# Patient Record
Sex: MALE | Race: BLACK OR AFRICAN AMERICAN | Employment: FULL TIME | URBAN - NONMETROPOLITAN AREA
[De-identification: names, ages, dates, MRNs, and addresses within clinical notes are randomized per-mention and may not be internally consistent; named-entity substitution may affect disease eponyms.]

---

## 2018-04-01 ENCOUNTER — NURSE TRIAGE (OUTPATIENT)
Dept: ADMINISTRATIVE | Age: 33
End: 2018-04-01

## 2018-08-17 ENCOUNTER — HOSPITAL ENCOUNTER (EMERGENCY)
Age: 33
Discharge: HOME | End: 2018-08-17
Payer: MEDICAID

## 2018-08-17 VITALS
BODY MASS INDEX: 32.93 KG/M2 | RESPIRATION RATE: 16 BRPM | HEIGHT: 70 IN | SYSTOLIC BLOOD PRESSURE: 152 MMHG | TEMPERATURE: 99 F | HEART RATE: 85 BPM | DIASTOLIC BLOOD PRESSURE: 105 MMHG | OXYGEN SATURATION: 96 % | WEIGHT: 230 LBS

## 2018-08-17 DIAGNOSIS — T63.481A INSECT STINGS, ACCIDENTAL OR UNINTENTIONAL, INITIAL ENCOUNTER: Primary | ICD-10-CM

## 2018-08-17 DIAGNOSIS — R03.0 ELEVATED BLOOD PRESSURE READING IN OFFICE WITHOUT DIAGNOSIS OF HYPERTENSION: ICD-10-CM

## 2018-08-17 PROCEDURE — 99213 OFFICE O/P EST LOW 20 MIN: CPT

## 2018-08-17 PROCEDURE — 99202 OFFICE O/P NEW SF 15 MIN: CPT | Performed by: NURSE PRACTITIONER

## 2018-08-17 RX ORDER — CETIRIZINE HYDROCHLORIDE 10 MG/1
10 TABLET ORAL DAILY
Qty: 30 TABLET | Refills: 0 | Status: SHIPPED | OUTPATIENT
Start: 2018-08-17 | End: 2018-09-16

## 2018-08-17 ASSESSMENT — PAIN DESCRIPTION - ORIENTATION: ORIENTATION: LEFT

## 2018-08-17 ASSESSMENT — PAIN SCALES - GENERAL: PAINLEVEL_OUTOF10: 5

## 2018-08-17 ASSESSMENT — ENCOUNTER SYMPTOMS
SHORTNESS OF BREATH: 0
NAUSEA: 0
CHEST TIGHTNESS: 0
ABDOMINAL PAIN: 0
VOMITING: 0
DIARRHEA: 0

## 2018-08-17 ASSESSMENT — PAIN DESCRIPTION - LOCATION: LOCATION: ARM

## 2018-08-17 NOTE — ED PROVIDER NOTES
and well-nourished. He is cooperative. He does not appear ill. No distress. HENT:   Head: Normocephalic. Mouth/Throat: Mucous membranes are normal.   Eyes: Conjunctivae are normal.   Neck: Normal range of motion. Cardiovascular: Normal rate. Pulmonary/Chest: Effort normal. No accessory muscle usage. No respiratory distress. Neurological: He is alert and oriented to person, place, and time. Skin: Skin is warm and dry. No rash noted. There is erythema (LUE wrist to elbow with minimal swelling. ). Psychiatric: He has a normal mood and affect. His speech is normal.   Nursing note and vitals reviewed. DIAGNOSTIC RESULTS   Labs:No results found for this visit on 08/17/18. IMAGING:  No orders to display     URGENT CARE COURSE:     Vitals:    08/17/18 1738 08/17/18 1743   BP: (!) 149/103 (!) 152/105   Pulse: 85    Resp: 16    Temp: 99 °F (37.2 °C)    TempSrc: Temporal    SpO2: 96%    Weight: 230 lb (104.3 kg)    Height: 5' 10\" (1.778 m)        Medications - No data to display  PROCEDURES:  None  FINAL IMPRESSION      1. Insect stings, accidental or unintentional, initial encounter    2. Elevated blood pressure reading in office without diagnosis of hypertension        DISPOSITION/PLAN   DISPOSITION    Discharge   Physical assessment findings, diagnostic testing(s) if applicable, and vital signs reviewed with patient/patient representative. Questions answered. Medications as directed, including OTC medications for supportive care. Education provided on medications. Differential diagnosis(s) discussed with patient/patient representative. Home care/self care instructions reviewed with patient/patient representative. Patient is to follow-up with family care provider in 2-3 days if no improvement. Patient is to go to the emergency department if symptoms worsen. Patient/patient representative is aware of care plan, questions answered, verbalizes understanding and is in agreement.   Teach back method used for patient/patient representative teaching(s) and printed instructions attached to after visit summary. PATIENT REFERRED TO:  [address] [place] [address] Urgent Care  [address] [facility]  [phone]    As needed, If symptoms worsen, GO DIRECTLY TO [address] [place] [address].  [address]  [place] [address]  [phone]    Schedule an appointment as soon as possible for a visit   For suture removal    DISCHARGE MEDICATIONS:  Discharge Medication List as of 8/17/2018  5:52 PM      START taking these medications    Details   cetirizine (ZYRTEC ALLERGY) 10 MG tablet Take 1 tablet by mouth daily, Disp-30 tablet, R-0Print           Discharge Medication List as of 8/17/2018  5:52 PM          Gerda Donovan, [address], APRN - CNP  08/17/18 4256

## 2019-01-27 ENCOUNTER — HOSPITAL ENCOUNTER (EMERGENCY)
Age: 34
Discharge: HOME | End: 2019-01-27

## 2019-01-27 VITALS
SYSTOLIC BLOOD PRESSURE: 144 MMHG | RESPIRATION RATE: 16 BRPM | HEART RATE: 76 BPM | OXYGEN SATURATION: 98 % | TEMPERATURE: 98.2 F | DIASTOLIC BLOOD PRESSURE: 76 MMHG

## 2019-01-27 DIAGNOSIS — J02.9 ACUTE PHARYNGITIS, UNSPECIFIED ETIOLOGY: Primary | ICD-10-CM

## 2019-01-27 LAB
GROUP A STREP CULTURE, REFLEX: NEGATIVE
REFLEX THROAT C + S: NORMAL

## 2019-01-27 PROCEDURE — 87880 STREP A ASSAY W/OPTIC: CPT

## 2019-01-27 PROCEDURE — 99213 OFFICE O/P EST LOW 20 MIN: CPT | Performed by: NURSE PRACTITIONER

## 2019-01-27 PROCEDURE — 87070 CULTURE OTHR SPECIMN AEROBIC: CPT

## 2019-01-27 PROCEDURE — 87077 CULTURE AEROBIC IDENTIFY: CPT

## 2019-01-27 PROCEDURE — 99213 OFFICE O/P EST LOW 20 MIN: CPT

## 2019-01-27 RX ORDER — AMOXICILLIN 500 MG/1
500 CAPSULE ORAL 2 TIMES DAILY
Qty: 20 CAPSULE | Refills: 0 | Status: SHIPPED | OUTPATIENT
Start: 2019-01-27 | End: 2019-02-06

## 2019-01-27 ASSESSMENT — ENCOUNTER SYMPTOMS
RHINORRHEA: 0
CHEST TIGHTNESS: 0
VOMITING: 0
SINUS PRESSURE: 0
SHORTNESS OF BREATH: 0
COUGH: 0
ABDOMINAL PAIN: 0
SORE THROAT: 1
TROUBLE SWALLOWING: 0
DIARRHEA: 0
NAUSEA: 0

## 2019-01-30 LAB
ORGANISM: ABNORMAL
THROAT/NOSE CULTURE: ABNORMAL
THROAT/NOSE CULTURE: ABNORMAL

## 2019-03-30 ENCOUNTER — HOSPITAL ENCOUNTER (OUTPATIENT)
Age: 34
Setting detail: SPECIMEN
Discharge: HOME | End: 2019-03-30

## 2019-04-01 LAB
C. TRACHOMATIS DNA ,URINE: NEGATIVE
N. GONORRHOEAE DNA, URINE: NEGATIVE
SPECIMEN DESCRIPTION: NORMAL

## 2019-04-05 LAB
SEND OUT REPORT: NORMAL
TEST NAME: NORMAL

## 2019-07-10 ENCOUNTER — HOSPITAL ENCOUNTER (EMERGENCY)
Age: 34
Discharge: HOME | End: 2019-07-10
Payer: MEDICAID

## 2019-07-10 VITALS
BODY MASS INDEX: 35.07 KG/M2 | WEIGHT: 245 LBS | OXYGEN SATURATION: 98 % | RESPIRATION RATE: 16 BRPM | DIASTOLIC BLOOD PRESSURE: 92 MMHG | TEMPERATURE: 98.2 F | HEART RATE: 87 BPM | SYSTOLIC BLOOD PRESSURE: 136 MMHG | HEIGHT: 70 IN

## 2019-07-10 DIAGNOSIS — R03.0 ELEVATED BLOOD PRESSURE READING: Primary | ICD-10-CM

## 2019-07-10 PROCEDURE — 99213 OFFICE O/P EST LOW 20 MIN: CPT | Performed by: NURSE PRACTITIONER

## 2019-07-10 PROCEDURE — 99212 OFFICE O/P EST SF 10 MIN: CPT

## 2019-07-10 NOTE — ED TRIAGE NOTES
Checked B/p at [employer] and got nervous that it may be too high. States he has had episodes of  Blurred vision and dizziness last two weeks.

## 2019-07-10 NOTE — ED PROVIDER NOTES
[facility]  Urgent Care Encounter      CHIEFCOMPLAINT       Chief Complaint   Patient presents with    Hypertension     onset 2 weeks, and occasional bloody noses. took B/p at [employer] and it was 148 105. Nurses Notes reviewed and I agree except as noted in the HPI. HISTORY OF PRESENT ILLNESS   Josselyn Rebolledo is a 26 y.o. male who presents: The history is provided by the patient. Hypertension   Onset quality:  Sudden  Timing:  Intermittent  Progression:  Unchanged  Chronicity:  New  Notable PTA blood pressures:  148/105 at [employer] machine  Context: not medication change, not OTC medications used and not stress    Relieved by:  None tried  Worsened by:  Nothing  Ineffective treatments:  None tried  Associated symptoms: dizziness (has resolved. Had with high blood pressure reading) and epistaxis (resolved)    Associated symptoms: no abdominal pain, no anxiety, no blurred vision, no chest pain, no confusion, no ear pain, no fatigue, no fever, no headaches, no hypokalemia, no loss of consciousness, no nausea, no neck pain, no palpitations, no peripheral edema, no shortness of breath, no syncope, no tinnitus, not vomiting and no weakness    Associated symptoms comment:  Past two weeks he has had dizziness and an occasional bloody nose he went and checked his blood pressure and noted it to be high. No longer has dizziness or epistaxis. Dizziness:     Severity:  Mild    Duration:  2 weeks    Timing:  Intermittent    Progression:  Resolved  Risk factors: family hx of HTN, obesity and tobacco use    Risk factors: no cardiac disease, no decongestant use, no diabetes, no kidney disease, no prior aneurysm and no prior stroke        REVIEW OF SYSTEMS     Review of Systems   Constitutional: Negative for activity change, appetite change, chills, diaphoresis, fatigue and fever. HENT: Positive for nosebleeds (resolved). Negative for ear pain and tinnitus.     Eyes: Negative for pre-hypertension or hypertension based on a blood pressure reading in the urgent care department. The patient was advised to monitor their pressure daily at possible. Joanna Metcalf also recommended to stop smoking day her tobacco dependency, decrease caffeine, lose weight, increase in exercise, take the medicine on a daily basis and they're taking blood pressure medication. I recommend that the patient call the primary care provider listed on their discharge instructions or a physician of their choice this week to arrange follow up for further evaluation of possible pre-hypertension or hypertension. If they develop any chest pain, shortness of breath, or any diaphoresis they're to dial 911 or go to the emergency department for further evaluation. They are agreeable to the treatment plan and left ambulatory without any problems. PATIENT REFERRED TO:  [address] [place] [address],[address] [facility]. [place] [address]  [phone]  Call in 1 day  , For appointment and to establish PCP take log    Patient instructed to follow up with PCP. If symptoms worsen, become severe or new symptoms develop patient instructedto go to the emergency room immediately. DISCHARGE MEDICATIONS:  There are no discharge medications for this patient. There are no discharge medications for this patient. Patient giveneducational materials - see patient instructions. Discussed use, benefit, and side effects of prescribed medications. All patient questions answered. Pt voiced understanding. Reviewed health maintenance. Patient agreedwith treatment plan. Follow up as directed.      DEB Barrow CNP, APRN - CNP  07/24/19 8516

## 2019-07-24 ASSESSMENT — ENCOUNTER SYMPTOMS
SHORTNESS OF BREATH: 0
COUGH: 0
BLURRED VISION: 0
STRIDOR: 0
NAUSEA: 0
ABDOMINAL PAIN: 0
BACK PAIN: 0
WHEEZING: 0
VOMITING: 0
EYE PAIN: 0

## 2021-04-11 ENCOUNTER — HOSPITAL ENCOUNTER (EMERGENCY)
Age: 36
Discharge: HOME | End: 2021-04-11
Attending: EMERGENCY MEDICINE
Payer: COMMERCIAL

## 2021-04-11 ENCOUNTER — APPOINTMENT (OUTPATIENT)
Dept: GENERAL RADIOLOGY | Age: 36
End: 2021-04-11
Payer: COMMERCIAL

## 2021-04-11 VITALS
SYSTOLIC BLOOD PRESSURE: 167 MMHG | RESPIRATION RATE: 18 BRPM | WEIGHT: 220 LBS | TEMPERATURE: 97.5 F | DIASTOLIC BLOOD PRESSURE: 120 MMHG | HEIGHT: 70 IN | BODY MASS INDEX: 31.5 KG/M2 | HEART RATE: 102 BPM | OXYGEN SATURATION: 94 %

## 2021-04-11 DIAGNOSIS — F19.10 SUBSTANCE ABUSE (HCC): Primary | ICD-10-CM

## 2021-04-11 PROCEDURE — 71045 X-RAY EXAM CHEST 1 VIEW: CPT

## 2021-04-11 PROCEDURE — 99283 EMERGENCY DEPT VISIT LOW MDM: CPT

## 2021-04-11 ASSESSMENT — ENCOUNTER SYMPTOMS
EYE REDNESS: 0
ABDOMINAL PAIN: 0
TROUBLE SWALLOWING: 0
BACK PAIN: 0
SHORTNESS OF BREATH: 0
VOMITING: 0
NAUSEA: 0
CHEST TIGHTNESS: 1
COUGH: 0

## 2021-04-11 NOTE — ED PROVIDER NOTES
[facility] [address] [address] EMERGENCY DEPT    EMERGENCY MEDICINE     Pt Name: Aaron Alejandra  MRN: [id number]  [place] 1985  Date of evaluation: 4/11/2021  Provider: Jose Manuel Michaud MD,     CHIEF COMPLAINT       Chief Complaint   Patient presents with    Paranoid    Ingestion     ecstasy        HISTORY OF PRESENT ILLNESS    Aaron Alejandra is a pleasant 38 y.o. male who presents to the emergency department from home for evaluation of paranoia. Per EMS the patient has called 911 so many times this evening that his phone ran out of battery. Patient states that he was supposed to be getting ecstasy from his friends and he feels like they gave him something different. Patient states that he was told that he was being paranoid but he did not feel that he was being paranoid. He denies any homicidal or suicidal thoughts. Patient does state that he has some mild rib tenderness when he takes a deep breath that has been ongoing for months. He also complains of clicking in his right knee that has been ongoing for 1 year. Patient states that he only came to the emergency room because the police recommended that he get checked out prior to going home. Triage notes and Nursing notes were reviewed by myself. Any discrepancies are addressed above. PAST MEDICAL HISTORY   History reviewed. No pertinent past medical history.     SURGICAL HISTORY       Past Surgical History:   Procedure Laterality Date    ANKLE SURGERY      HAND SURGERY Right        CURRENT MEDICATIONS       Previous Medications    No medications on file       ALLERGIES     Dilaudid [hydromorphone]    FAMILY HISTORY       Family History   Problem Relation Age of Onset    High Blood Pressure Mother     High Blood Pressure Father         SOCIAL HISTORY       Social History     Socioeconomic History    Marital status:      Spouse name: None    Number of children: None    Years of education: None    Highest education level: None   Occupational History    None   Social Needs    Financial resource strain: None    Food insecurity     Worry: None     Inability: None    Transportation needs     Medical: None     Non-medical: None   Tobacco Use    Smoking status: Current Every Day Smoker     Packs/day: 1.00     Types: Cigarettes, Cigars    Smokeless tobacco: Never Used   Substance and Sexual Activity    Alcohol use: Yes     Alcohol/week: 1.0 standard drinks     Types: 1 Shots of liquor per week     Comment: 3 x a week    Drug use: Not Currently     Types: Marijuana    Sexual activity: None   Lifestyle    Physical activity     Days per week: None     Minutes per session: None    Stress: None   Relationships    Social connections     Talks on phone: None     Gets together: None     Attends Judaism service: None     Active member of club or organization: None     Attends meetings of clubs or organizations: None     Relationship status: None    Intimate partner violence     Fear of current or ex partner: None     Emotionally abused: None     Physically abused: None     Forced sexual activity: None   Other Topics Concern    None   Social History Narrative    None       REVIEW OF SYSTEMS     Review of Systems   Constitutional: Negative for fatigue and fever. HENT: Negative for congestion and trouble swallowing. Eyes: Negative for redness. Respiratory: Positive for chest tightness. Negative for cough and shortness of breath. Cardiovascular: Negative for chest pain. Gastrointestinal: Negative for abdominal pain, nausea and vomiting. Genitourinary: Negative for difficulty urinating. Musculoskeletal: Positive for arthralgias. Negative for back pain. Skin: Negative for rash. Allergic/Immunologic: Negative for immunocompromised state. Neurological: Negative for light-headedness and headaches. Hematological: Does not bruise/bleed easily. Psychiatric/Behavioral: The patient is nervous/anxious.         Except as noted above the patient agrees        ED Medications administered this visit:  Medications - No data to display      Procedures: (None if blank)       CLINICAL       1.  Substance abuse [facility])          DISPOSITION/PLAN   DISPOSITION Decision To Discharge 04/11/2021 06:27:27 AM      PATIENT REFERRED TO:  Your pcp    Schedule an appointment as soon as possible for a visit   If symptoms worsen      DISCHARGE MEDICATIONS:  New Prescriptions    No medications on file              (Please note that portions of this note were completed with a voice recognition program.  Efforts were made to edit the dictations but occasionallywords are mis-transcribed.)      Electronically signed by Eron Carter MD on 4/11/21 at 6:28 AM EDT    Attending Physician, Emergency Department       Jesus Rosas MD  04/11/21 3173

## 2021-04-11 NOTE — ED NOTES
Pt to ED via [facility] with c/o paranoia per police. Pt himself states that he is not in fact paranoid, but that he hasn't slept in 3 days. Pt states he did cocaine Friday and Saturday and took ecstasy last night. Pt states that he told police to take him home because he feels okay and is having no pain, but they recommended he get checked. Pt states he is curious what will show up on a drug screen, but not in anything else. BP elevated at this time, other VSS. Call light in place.  Pt provided with urinal for a specimen     [facility]  04/11/21 9193

## 2021-11-02 ENCOUNTER — APPOINTMENT (OUTPATIENT)
Dept: GENERAL RADIOLOGY | Age: 36
End: 2021-11-02
Payer: COMMERCIAL

## 2021-11-02 ENCOUNTER — HOSPITAL ENCOUNTER (EMERGENCY)
Age: 36
Discharge: HOME | End: 2021-11-02
Payer: COMMERCIAL

## 2021-11-02 VITALS
SYSTOLIC BLOOD PRESSURE: 154 MMHG | DIASTOLIC BLOOD PRESSURE: 77 MMHG | OXYGEN SATURATION: 98 % | HEART RATE: 78 BPM | TEMPERATURE: 98.3 F | RESPIRATION RATE: 16 BRPM

## 2021-11-02 DIAGNOSIS — S93.402A MILD SPRAIN OF LEFT ANKLE, INITIAL ENCOUNTER: Primary | ICD-10-CM

## 2021-11-02 PROCEDURE — 99213 OFFICE O/P EST LOW 20 MIN: CPT

## 2021-11-02 PROCEDURE — 73610 X-RAY EXAM OF ANKLE: CPT

## 2021-11-02 PROCEDURE — 99214 OFFICE O/P EST MOD 30 MIN: CPT | Performed by: NURSE PRACTITIONER

## 2021-11-02 RX ORDER — IBUPROFEN 200 MG
600 TABLET ORAL EVERY 6 HOURS PRN
Qty: 120 TABLET | Refills: 3 | COMMUNITY
Start: 2021-11-02

## 2021-11-02 ASSESSMENT — PAIN SCALES - GENERAL: PAINLEVEL_OUTOF10: 7

## 2021-11-02 ASSESSMENT — PAIN DESCRIPTION - PAIN TYPE: TYPE: ACUTE PAIN

## 2021-11-02 ASSESSMENT — PAIN DESCRIPTION - ORIENTATION: ORIENTATION: LEFT

## 2021-11-02 ASSESSMENT — PAIN DESCRIPTION - LOCATION: LOCATION: ANKLE

## 2021-11-02 NOTE — Clinical Note
Raul Cuellar was seen and treated in our emergency department on 11/2/2021. He may return to work on 11/03/2021. If you have any questions or concerns, please don't hesitate to call.       Sherial Mortimer, APRN - CNP

## 2021-11-02 NOTE — ED PROVIDER NOTES
[place] [address]  Urgent Care Encounter       CHIEF COMPLAINT       Chief Complaint   Patient presents with    Ankle Injury       Nurses Notes reviewed and I agree except as noted in the HPI. HISTORY OF PRESENT ILLNESS   Ximena Yoon is a 43 y.o. male who presents with complaints of outside left ankle pain after he tripped on some shoes this morning. This is a new problem. He has not tried anything for treatment and presented to urgent care for evaluation. Movement seems to make the pain worse. He denies any swelling or wound. He denies any numbness or tingling. The history is provided by the patient. REVIEW OF SYSTEMS     Review of Systems   Constitutional: Negative for activity change. Musculoskeletal: Positive for arthralgias. Negative for gait problem and joint swelling. Neurological: Negative for weakness and numbness. PAST MEDICAL HISTORY   History reviewed. No pertinent past medical history. SURGICALHISTORY     Patient  has a past surgical history that includes Ankle surgery and Hand surgery (Right). CURRENT MEDICATIONS       Previous Medications    No medications on file       ALLERGIES     Patient is is allergic to dilaudid [hydromorphone]. Patients   There is no immunization history on file for this patient. FAMILY HISTORY     Patient's family history includes High Blood Pressure in his father and mother. SOCIAL HISTORY     Patient  reports that he has been smoking cigarettes and cigars. He has been smoking about 1.00 pack per day. He has never used smokeless tobacco. He reports current alcohol use of about 1.0 standard drinks of alcohol per week. He reports previous drug use. Drug: Marijuana Kayceartur Garrido). PHYSICAL EXAM     ED TRIAGE VITALS  BP: (!) 154/77, Temp: 98.3 °F (36.8 °C), Pulse: 78, Resp: 16, SpO2: 98 %,Estimated body mass index is 31.57 kg/m² as calculated from the following:    Height as of 4/11/21: 5' 10\" (1.778 m).     Weight as of 4/11/21: 220 lb (99.8 kg). ,No LMP for male patient. Physical Exam  Vitals and nursing note reviewed. Constitutional:       General: He is not in acute distress. Cardiovascular:      Rate and Rhythm: Normal rate and regular rhythm. Pulses: Normal pulses. Pulmonary:      Effort: Pulmonary effort is normal.   Musculoskeletal:         General: Tenderness present. No swelling or deformity. Left ankle: No swelling or deformity. Tenderness present over the lateral malleolus. Decreased range of motion. Skin:     General: Skin is warm and dry. Findings: No bruising or erythema. Neurological:      Mental Status: He is alert. DIAGNOSTIC RESULTS     Labs:No results found for this visit on 11/02/21. IMAGING:    XR ANKLE LEFT (MIN 3 VIEWS)   Final Result         1. Stable left ankle radiographs, no interval change since previous study dated 14 February 2014. No acute fracture noted. .               **This report has been created using voice recognition software. It may contain minor errors which are inherent in voice recognition technology. **      Final report electronically signed by DR Mona Morelos on 11/2/2021 11:26 AM          I have independently reviewed the radiology images as well as the radiologist's report and have discussed them with the patient. EKG:  None    URGENT CARE COURSE:     Vitals:    11/02/21 1100   BP: (!) 154/77   Pulse: 78   Resp: 16   Temp: 98.3 °F (36.8 °C)   TempSrc: Temporal   SpO2: 98%       Medications - No data to display         PROCEDURES:  None    FINAL IMPRESSION      1. Mild sprain of left ankle, initial encounter      DISPOSITION/ PLAN   DISPOSITION Decision To Discharge 11/02/2021 11:46:13 AM     Exam consistent with mild sprain of the left ankle. Recommend ibuprofen 4 times daily for discomfort and inflammation. Instructed he should rest, ice, compress, and elevate today. I return to work tomorrow.   [facility] if symptoms worsen or fail to improve in 10 days. PATIENT REFERRED TO:  No primary care provider on file. No primary physician on file.       DISCHARGE MEDICATIONS:  New Prescriptions    IBUPROFEN (ADVIL) 200 MG TABLET    Take 3 tablets by mouth every 6 hours as needed for Pain       Discontinued Medications    No medications on file       Current Discharge Medication List          Leander Saint, APRN - CNP    (Please note that portions of this note were completed with a voice recognition program. Efforts were made to edit the dictations but occasionally words are mis-transcribed.)           Leander Saint, APRN - CNP  11/02/21 5035

## 2021-11-08 ENCOUNTER — HOSPITAL ENCOUNTER (EMERGENCY)
Age: 36
Discharge: HOME | End: 2021-11-08
Attending: EMERGENCY MEDICINE
Payer: COMMERCIAL

## 2021-11-08 ENCOUNTER — APPOINTMENT (OUTPATIENT)
Dept: GENERAL RADIOLOGY | Age: 36
End: 2021-11-08
Payer: COMMERCIAL

## 2021-11-08 VITALS
OXYGEN SATURATION: 97 % | SYSTOLIC BLOOD PRESSURE: 168 MMHG | TEMPERATURE: 99.3 F | DIASTOLIC BLOOD PRESSURE: 89 MMHG | RESPIRATION RATE: 18 BRPM | HEART RATE: 85 BPM

## 2021-11-08 DIAGNOSIS — J45.909 ASTHMATIC BRONCHITIS WITHOUT COMPLICATION, UNSPECIFIED ASTHMA SEVERITY, UNSPECIFIED WHETHER PERSISTENT: Primary | ICD-10-CM

## 2021-11-08 DIAGNOSIS — F17.200 SMOKING: ICD-10-CM

## 2021-11-08 LAB
ANION GAP SERPL CALCULATED.3IONS-SCNC: 12 MEQ/L (ref 8–16)
BASOPHILS # BLD: 0.2 %
BASOPHILS ABSOLUTE: 0 THOU/MM3 (ref 0–0.1)
BUN BLDV-MCNC: 10 MG/DL (ref 7–22)
CALCIUM SERPL-MCNC: 9.3 MG/DL (ref 8.5–10.5)
CHLORIDE BLD-SCNC: 105 MEQ/L (ref 98–111)
CO2: 24 MEQ/L (ref 23–33)
CREAT SERPL-MCNC: 1 MG/DL (ref 0.4–1.2)
EOSINOPHIL # BLD: 3.2 %
EOSINOPHILS ABSOLUTE: 0.4 THOU/MM3 (ref 0–0.4)
ERYTHROCYTE [DISTWIDTH] IN BLOOD BY AUTOMATED COUNT: 12.8 % (ref 11.5–14.5)
ERYTHROCYTE [DISTWIDTH] IN BLOOD BY AUTOMATED COUNT: 46.3 FL (ref 35–45)
FLU A ANTIGEN: NEGATIVE
FLU B ANTIGEN: NEGATIVE
GFR SERPL CREATININE-BSD FRML MDRD: > 90 ML/MIN/1.73M2
GLUCOSE BLD-MCNC: 99 MG/DL (ref 70–108)
HCT VFR BLD CALC: 48.8 % (ref 42–52)
HEMOGLOBIN: 15.9 GM/DL (ref 14–18)
IMMATURE GRANS (ABS): 0.04 THOU/MM3 (ref 0–0.07)
IMMATURE GRANULOCYTES: 0.3 %
LYMPHOCYTES # BLD: 22 %
LYMPHOCYTES ABSOLUTE: 3.1 THOU/MM3 (ref 1–4.8)
MCH RBC QN AUTO: 32.2 PG (ref 26–33)
MCHC RBC AUTO-ENTMCNC: 32.6 GM/DL (ref 32.2–35.5)
MCV RBC AUTO: 98.8 FL (ref 80–94)
MONOCYTES # BLD: 9.8 %
MONOCYTES ABSOLUTE: 1.4 THOU/MM3 (ref 0.4–1.3)
NUCLEATED RED BLOOD CELLS: 0 /100 WBC
OSMOLALITY CALCULATION: 280.3 MOSMOL/KG (ref 275–300)
PLATELET # BLD: 247 THOU/MM3 (ref 130–400)
PMV BLD AUTO: 10.8 FL (ref 9.4–12.4)
POTASSIUM SERPL-SCNC: 4.3 MEQ/L (ref 3.5–5.2)
RBC # BLD: 4.94 MILL/MM3 (ref 4.7–6.1)
SARS-COV-2, NAAT: NOT DETECTED
SEG NEUTROPHILS: 64.5 %
SEGMENTED NEUTROPHILS ABSOLUTE COUNT: 9 THOU/MM3 (ref 1.8–7.7)
SODIUM BLD-SCNC: 141 MEQ/L (ref 135–145)
TROPONIN T: < 0.01 NG/ML
WBC # BLD: 13.9 THOU/MM3 (ref 4.8–10.8)

## 2021-11-08 PROCEDURE — 80048 BASIC METABOLIC PNL TOTAL CA: CPT

## 2021-11-08 PROCEDURE — 36415 COLL VENOUS BLD VENIPUNCTURE: CPT

## 2021-11-08 PROCEDURE — 85025 COMPLETE CBC W/AUTO DIFF WBC: CPT

## 2021-11-08 PROCEDURE — 99281 EMR DPT VST MAYX REQ PHY/QHP: CPT

## 2021-11-08 PROCEDURE — 94640 AIRWAY INHALATION TREATMENT: CPT

## 2021-11-08 PROCEDURE — 71046 X-RAY EXAM CHEST 2 VIEWS: CPT

## 2021-11-08 PROCEDURE — 6370000000 HC RX 637 (ALT 250 FOR IP): Performed by: EMERGENCY MEDICINE

## 2021-11-08 PROCEDURE — 93005 ELECTROCARDIOGRAM TRACING: CPT | Performed by: EMERGENCY MEDICINE

## 2021-11-08 PROCEDURE — 87804 INFLUENZA ASSAY W/OPTIC: CPT

## 2021-11-08 PROCEDURE — 84484 ASSAY OF TROPONIN QUANT: CPT

## 2021-11-08 PROCEDURE — 87635 SARS-COV-2 COVID-19 AMP PRB: CPT

## 2021-11-08 RX ORDER — AZITHROMYCIN 250 MG/1
TABLET, FILM COATED ORAL
Qty: 1 PACKET | Refills: 0 | Status: SHIPPED | OUTPATIENT
Start: 2021-11-08 | End: 2021-11-18

## 2021-11-08 RX ORDER — PREDNISONE 10 MG/1
TABLET ORAL
Qty: 15 TABLET | Refills: 0 | Status: SHIPPED | OUTPATIENT
Start: 2021-11-08

## 2021-11-08 RX ORDER — PREDNISONE 10 MG/1
TABLET ORAL
Qty: 16 TABLET | Refills: 0 | Status: SHIPPED | OUTPATIENT
Start: 2021-11-08 | End: 2021-11-08 | Stop reason: SDUPTHER

## 2021-11-08 RX ORDER — ALBUTEROL SULFATE 90 UG/1
2 AEROSOL, METERED RESPIRATORY (INHALATION) 4 TIMES DAILY PRN
Qty: 18 G | Refills: 0 | Status: SHIPPED | OUTPATIENT
Start: 2021-11-08 | End: 2021-11-08 | Stop reason: SDUPTHER

## 2021-11-08 RX ORDER — ALBUTEROL SULFATE 90 UG/1
2 AEROSOL, METERED RESPIRATORY (INHALATION) 4 TIMES DAILY PRN
Qty: 18 G | Refills: 0 | Status: SHIPPED | OUTPATIENT
Start: 2021-11-08

## 2021-11-08 RX ORDER — IPRATROPIUM BROMIDE AND ALBUTEROL SULFATE 2.5; .5 MG/3ML; MG/3ML
1 SOLUTION RESPIRATORY (INHALATION) ONCE
Status: COMPLETED | OUTPATIENT
Start: 2021-11-08 | End: 2021-11-08

## 2021-11-08 RX ORDER — AZITHROMYCIN 250 MG/1
TABLET, FILM COATED ORAL
Qty: 1 PACKET | Refills: 0 | Status: SHIPPED | OUTPATIENT
Start: 2021-11-08 | End: 2021-11-08 | Stop reason: SDUPTHER

## 2021-11-08 RX ADMIN — IPRATROPIUM BROMIDE AND ALBUTEROL SULFATE 1 AMPULE: .5; 3 SOLUTION RESPIRATORY (INHALATION) at 13:50

## 2021-11-08 ASSESSMENT — PAIN DESCRIPTION - PAIN TYPE: TYPE: ACUTE PAIN

## 2021-11-08 ASSESSMENT — PAIN DESCRIPTION - LOCATION: LOCATION: CHEST

## 2021-11-08 ASSESSMENT — PAIN SCALES - GENERAL: PAINLEVEL_OUTOF10: 8

## 2021-11-08 NOTE — ED PROVIDER NOTES
[address] [facility]        Room # 24/791I    CHIEF COMPLAINT    Chief Complaint   Patient presents with    Chest Pain    Cough       Nurses Notes reviewed and I agree except as noted in the HPI. HPI    Kendy Ahumada is a 43 y.o. male who presents for evaluation of coughing and congestion since yesterday. Patient slept all day long, patient last night started coughing more and breathing hard, today had a fever 102.4. Patient is unvaccinated, denies any chest pain no nausea vomiting no diarrhea. Patient is a smoker. Patient denies anosmia or ageusia      REVIEW OF SYSTEMS    Review of Systems   Constitutional: Positive for chills and fever. Negative for appetite change, diaphoresis and fatigue. HENT: Positive for congestion and rhinorrhea. Negative for ear discharge, ear pain, postnasal drip, sinus pressure, sore throat, trouble swallowing and voice change. Respiratory: Positive for cough and shortness of breath. Negative for chest tightness and wheezing. Cardiovascular: Negative for chest pain, palpitations and leg swelling. Gastrointestinal: Negative for abdominal pain, constipation, diarrhea, nausea and vomiting. Musculoskeletal: Negative for arthralgias, back pain, joint swelling, myalgias, neck pain and neck stiffness. Skin: Negative for rash. Neurological: Negative for dizziness, syncope, weakness, light-headedness, numbness and headaches. PAST MEDICAL HISTORY     has no past medical history on file. SURGICAL HISTORY   has a past surgical history that includes Ankle surgery and Hand surgery (Right). CURRENT MEDICATIONS    Previous Medications    IBUPROFEN (ADVIL) 200 MG TABLET    Take 3 tablets by mouth every 6 hours as needed for Pain       ALLERGIES    is allergic to dilaudid [hydromorphone]. FAMILY HISTORY    He indicated that his mother is alive. He indicated that his father is alive.    family history includes High Blood Pressure in his father and mother. SOCIAL HISTORY     reports that he has been smoking cigarettes and cigars. He has been smoking about 1.00 pack per day. He has never used smokeless tobacco. He reports current alcohol use of about 1.0 standard drink of alcohol per week. He reports previous drug use. Drug: Marijuana Shweta Ambbismark). PHYSICAL EXAM      INITIAL VITALS: BP (!) 168/89   Pulse 85   Temp 99.3 °F (37.4 °C) (Oral)   Resp 18   SpO2 97% Estimated body mass index is 31.57 kg/m² as calculated from the following:    Height as of 4/11/21: 5' 10\" (1.778 m). Weight as of 4/11/21: 220 lb (99.8 kg). Physical Exam  Vitals reviewed. Constitutional:       Appearance: He is well-developed. HENT:      Head: Normocephalic and atraumatic. Right Ear: External ear normal.      Left Ear: External ear normal.      Nose: Nose normal.   Eyes:      General: No scleral icterus. Conjunctiva/sclera: Conjunctivae normal.      Pupils: Pupils are equal, round, and reactive to light. Neck:      Thyroid: No thyromegaly. Vascular: No JVD. Cardiovascular:      Rate and Rhythm: Normal rate and regular rhythm. Heart sounds: No murmur heard. No friction rub. Pulmonary:      Effort: Pulmonary effort is normal.      Breath sounds: Wheezing and rhonchi present. No rales. Chest:      Chest wall: No tenderness. Abdominal:      General: Bowel sounds are normal.      Palpations: Abdomen is soft. There is no mass. Tenderness: There is no abdominal tenderness. Musculoskeletal:      Cervical back: Normal range of motion and neck supple. Lymphadenopathy:      Cervical: No cervical adenopathy. Skin:     Findings: No rash. Neurological:      Mental Status: He is alert and oriented to person, place, and time. Psychiatric:         Behavior: Behavior is cooperative.          MEDICAL DECISION MAKING    DIFFERENTIAL DIAGNOSIS:  Bronchitis, pneumonia, Covid,      DIAGNOSTIC RESULTS      RADIOLOGY:  I have reviewed radiologic plain film image(s). The plain films will be read or overread by the radiologist.All other non-plain film images(s) such as CT, Ultrasound and MRI have been read by the radiologist.  XR CHEST (2 VW)   Final Result      Normal chest radiographs. **This report has been created using voice recognition software. It may contain minor errors which are inherent in voice recognition technology. **      Final report electronically signed by Dr. Tamie Alberto on 11/8/2021 1:36 PM          LABS:   Labs Reviewed   CBC WITH AUTO DIFFERENTIAL - Abnormal; Notable for the following components:       Result Value    WBC 13.9 (*)     MCV 98.8 (*)     RDW-SD 46.3 (*)     Segs Absolute 9.0 (*)     Monocytes Absolute 1.4 (*)     All other components within normal limits   RAPID INFLUENZA A/B ANTIGENS   COVID-19, RAPID   TROPONIN   BASIC METABOLIC PANEL   ANION GAP   OSMOLALITY   GLOMERULAR FILTRATION RATE, ESTIMATED     All other unresulted laboratory test above are normal:    Vitals:    Vitals:    11/08/21 1246   BP: (!) 168/89   Pulse: 85   Resp: 18   Temp: 99.3 °F (37.4 °C)   TempSrc: Oral   SpO2: 97%       EMERGENCY DEPARTMENT COURSE:    Medications   ipratropium-albuterol (DUONEB) nebulizer solution 1 ampule (1 ampule Inhalation Given 11/8/21 1350)       The pt was seen and evaluated by me. Within the department, I observed the pt's vitalsigns to be within acceptable range. Laboratory and Radiological studies were performed, results were reviewed with the patient. Within the department, the pt was treated with DuoNeb. I observed the pt's condition to hemodynamically stable during the duration of their stay. I explained my proposed course of treatment to the pt, and they were amenable to my decision. They were discharged home, and they will return to the ED if their symptoms become more severein nature, or otherwise change.      Advised patient to get Covid Vaccine and quit smoking. CRITICAL CARE:   None. CONSULTS:  None    PROCEDURES:  None. FINAL IMPRESSION       1. Asthmatic bronchitis without complication, unspecified asthma severity, unspecified whether persistent    2. Smoking          DISPOSITION/PLAN  PATIENT REFERRED TO:  [address] [address],[address] [address] [address] [facility] [place] [facility]  Schedule an appointment as soon as possible for a visit in 3 days      DISCHARGE MEDICATIONS:  New Prescriptions    ALBUTEROL SULFATE HFA (VENTOLIN HFA) 108 (90 BASE) MCG/ACT INHALER    Inhale 2 puffs into the lungs 4 times daily as needed for Wheezing    AZITHROMYCIN (ZITHROMAX) 250 MG TABLET    Take 2 tabs (500 mg) on Day 1, and take 1 tab (250 mg) on days 2 through 5. PREDNISONE (DELTASONE) 10 MG TABLET    2 tablets 2 times a day for 2 days then 1  Tablet 2 times a day for 2 days, then 1 tablet daily till gone    SPACER/AERO-HOLDING CHAMBERS ROLANDO    1 Device by Does not apply route daily as needed (Use it with the albuterol inhaler 3-4 times daily as needed)         (Please note that portions of this note were completed with a voice recognition program and electronically transcribed. Efforts were [facility] edit the dictations but occasionally words are mis-transcribed . The transcription may contain errors not detected in proofreading.   This transcription was electronically signed.)     11/08/21 2:17 PM      La Vivar MD      Emergency room physician           La Vivar MD  11/08/21 [address], [place]  11/13/21 4316

## 2021-11-09 LAB
EKG ATRIAL RATE: 79 BPM
EKG P AXIS: 43 DEGREES
EKG P-R INTERVAL: 146 MS
EKG Q-T INTERVAL: 368 MS
EKG QRS DURATION: 80 MS
EKG QTC CALCULATION (BAZETT): 421 MS
EKG R AXIS: 60 DEGREES
EKG T AXIS: 46 DEGREES
EKG VENTRICULAR RATE: 79 BPM

## 2021-11-09 PROCEDURE — 93010 ELECTROCARDIOGRAM REPORT: CPT | Performed by: INTERNAL MEDICINE

## 2021-11-13 ASSESSMENT — ENCOUNTER SYMPTOMS
RHINORRHEA: 1
COUGH: 1
CONSTIPATION: 0
SINUS PRESSURE: 0
WHEEZING: 0
VOICE CHANGE: 0
NAUSEA: 0
VOMITING: 0
SORE THROAT: 0
TROUBLE SWALLOWING: 0
BACK PAIN: 0
ABDOMINAL PAIN: 0
CHEST TIGHTNESS: 0
DIARRHEA: 0
SHORTNESS OF BREATH: 1

## 2022-03-21 ENCOUNTER — TELEPHONE (OUTPATIENT)
Dept: INFECTIOUS DISEASES | Age: 37
End: 2022-03-21

## 2022-03-24 ENCOUNTER — TELEPHONE (OUTPATIENT)
Dept: INFECTIOUS DISEASES | Age: 37
End: 2022-03-24

## 2022-03-28 ENCOUNTER — TELEPHONE (OUTPATIENT)
Dept: INFECTIOUS DISEASES | Age: 37
End: 2022-03-28

## 2022-03-28 ENCOUNTER — HOSPITAL ENCOUNTER (OUTPATIENT)
Age: 37
Setting detail: SPECIMEN
Discharge: HOME | End: 2022-03-28

## 2022-03-28 LAB
ABSOLUTE EOS #: 0.11 K/UL (ref 0–0.4)
ABSOLUTE IMMATURE GRANULOCYTE: 0 K/UL (ref 0–0.3)
ABSOLUTE LYMPH #: 4.71 K/UL (ref 1–4.8)
ABSOLUTE MONO #: 1.28 K/UL (ref 0.1–0.8)
ALBUMIN SERPL-MCNC: 4.4 G/DL (ref 3.5–5.2)
ALBUMIN/GLOBULIN RATIO: 1.5 (ref 1–2.5)
ALP BLD-CCNC: 69 U/L (ref 40–129)
ALT SERPL-CCNC: 15 U/L (ref 5–41)
ANION GAP SERPL CALCULATED.3IONS-SCNC: 11 MMOL/L (ref 9–17)
AST SERPL-CCNC: 23 U/L
ATYPICAL LYMPHOCYTE ABSOLUTE COUNT: 0.43 K/UL
ATYPICAL LYMPHOCYTES: 4 %
BASOPHILS # BLD: 0 % (ref 0–2)
BASOPHILS ABSOLUTE: 0 K/UL (ref 0–0.2)
BILIRUB SERPL-MCNC: 0.21 MG/DL (ref 0.3–1.2)
BUN BLDV-MCNC: 13 MG/DL (ref 6–20)
CALCIUM SERPL-MCNC: 9.7 MG/DL (ref 8.6–10.4)
CHLORIDE BLD-SCNC: 106 MMOL/L (ref 98–107)
CHOLESTEROL/HDL RATIO: 3.3
CHOLESTEROL: 153 MG/DL
CO2: 23 MMOL/L (ref 20–31)
CREAT SERPL-MCNC: 0.91 MG/DL (ref 0.7–1.2)
EOSINOPHILS RELATIVE PERCENT: 1 % (ref 1–4)
GFR AFRICAN AMERICAN: >60 ML/MIN
GFR NON-AFRICAN AMERICAN: >60 ML/MIN
GFR SERPL CREATININE-BSD FRML MDRD: ABNORMAL ML/MIN/{1.73_M2}
GLUCOSE BLD-MCNC: 91 MG/DL (ref 70–99)
HCT VFR BLD CALC: 49.2 % (ref 40.7–50.3)
HDLC SERPL-MCNC: 46 MG/DL
HEMOGLOBIN: 16.8 G/DL (ref 13–17)
IMMATURE GRANULOCYTES: 0 %
LDL CHOLESTEROL: 78 MG/DL (ref 0–130)
LYMPHOCYTES # BLD: 44 % (ref 24–44)
MCH RBC QN AUTO: 32.2 PG (ref 25.2–33.5)
MCHC RBC AUTO-ENTMCNC: 34.1 G/DL (ref 28.4–34.8)
MCV RBC AUTO: 94.3 FL (ref 82.6–102.9)
MONOCYTES # BLD: 12 % (ref 1–7)
MORPHOLOGY: NORMAL
NRBC AUTOMATED: 0 PER 100 WBC
PDW BLD-RTO: 13 % (ref 11.8–14.4)
PLATELET # BLD: 235 K/UL (ref 138–453)
PMV BLD AUTO: 11.7 FL (ref 8.1–13.5)
POTASSIUM SERPL-SCNC: 4.4 MMOL/L (ref 3.7–5.3)
RBC # BLD: 5.22 M/UL (ref 4.21–5.77)
SEG NEUTROPHILS: 39 % (ref 36–66)
SEGMENTED NEUTROPHILS ABSOLUTE COUNT: 4.17 K/UL (ref 1.8–7.7)
SODIUM BLD-SCNC: 140 MMOL/L (ref 135–144)
TOTAL PROTEIN: 7.4 G/DL (ref 6.4–8.3)
TRIGL SERPL-MCNC: 143 MG/DL
TSH SERPL DL<=0.05 MIU/L-ACNC: 1.11 MIU/L (ref 0.3–5)
WBC # BLD: 10.7 K/UL (ref 3.5–11.3)

## 2022-05-05 ENCOUNTER — HOSPITAL ENCOUNTER (OUTPATIENT)
Age: 37
Setting detail: SPECIMEN
Discharge: HOME | End: 2022-05-05

## 2022-05-05 LAB — T. PALLIDUM, IGG: NONREACTIVE

## 2022-05-06 LAB
ABSOLUTE CD 4 HELPER: 1416 /UL (ref 309–1571)
CD4 % HELPER T CELL: 37 % (ref 27–64)
LYMPHOCYTES # BLD: 44 % (ref 24–44)
WBC # BLD: 8.7 K/UL (ref 3.5–11)

## 2022-05-08 LAB
REASON FOR REJECTION: NORMAL
ZZ NTE CLEAN UP: ORDERED TEST: NORMAL
ZZ NTE WITH NAME CLEAN UP: SPECIMEN SOURCE: NORMAL

## 2022-05-09 ENCOUNTER — HOSPITAL ENCOUNTER (OUTPATIENT)
Age: 37
Setting detail: SPECIMEN
Discharge: HOME | End: 2022-05-09

## 2022-05-14 LAB
HIV 1 QNT: 41.8 CPY/ML
HIV 1 RNA, LOG NUMBER PCR: 1.62 LOG CPY/ML
HIV-1 RNA BY PCR, QN: DETECTED
SOURCE: ABNORMAL

## 2022-11-06 ENCOUNTER — HOSPITAL ENCOUNTER (EMERGENCY)
Age: 37
Discharge: HOME | End: 2022-11-06
Attending: EMERGENCY MEDICINE
Payer: COMMERCIAL

## 2022-11-06 VITALS
HEIGHT: 70 IN | WEIGHT: 245 LBS | SYSTOLIC BLOOD PRESSURE: 124 MMHG | OXYGEN SATURATION: 98 % | TEMPERATURE: 97.2 F | DIASTOLIC BLOOD PRESSURE: 76 MMHG | BODY MASS INDEX: 35.07 KG/M2 | RESPIRATION RATE: 16 BRPM | HEART RATE: 95 BPM

## 2022-11-06 DIAGNOSIS — L02.91 ABSCESS: Primary | ICD-10-CM

## 2022-11-06 PROCEDURE — 99283 EMERGENCY DEPT VISIT LOW MDM: CPT

## 2022-11-06 PROCEDURE — 2500000003 HC RX 250 WO HCPCS

## 2022-11-06 PROCEDURE — 6370000000 HC RX 637 (ALT 250 FOR IP): Performed by: STUDENT IN AN ORGANIZED HEALTH CARE EDUCATION/TRAINING PROGRAM

## 2022-11-06 PROCEDURE — 10061 I&D ABSCESS COMP/MULTIPLE: CPT

## 2022-11-06 RX ORDER — LIDOCAINE HYDROCHLORIDE 10 MG/ML
INJECTION, SOLUTION EPIDURAL; INFILTRATION; INTRACAUDAL; PERINEURAL
Status: COMPLETED
Start: 2022-11-06 | End: 2022-11-06

## 2022-11-06 RX ORDER — DOXYCYCLINE HYCLATE 100 MG
100 TABLET ORAL ONCE
Status: COMPLETED | OUTPATIENT
Start: 2022-11-06 | End: 2022-11-06

## 2022-11-06 RX ORDER — IBUPROFEN 200 MG
400 TABLET ORAL ONCE
Status: COMPLETED | OUTPATIENT
Start: 2022-11-06 | End: 2022-11-06

## 2022-11-06 RX ORDER — LIDOCAINE HYDROCHLORIDE 10 MG/ML
6 INJECTION, SOLUTION EPIDURAL; INFILTRATION; INTRACAUDAL; PERINEURAL ONCE
Status: COMPLETED | OUTPATIENT
Start: 2022-11-06 | End: 2022-11-06

## 2022-11-06 RX ORDER — HYDROCODONE BITARTRATE AND ACETAMINOPHEN 5; 325 MG/1; MG/1
1 TABLET ORAL EVERY 8 HOURS PRN
Qty: 6 TABLET | Refills: 0 | Status: SHIPPED | OUTPATIENT
Start: 2022-11-06 | End: 2022-11-08

## 2022-11-06 RX ORDER — DIPHENHYDRAMINE HCL 25 MG
25 TABLET ORAL ONCE
Status: DISCONTINUED | OUTPATIENT
Start: 2022-11-06 | End: 2022-11-06 | Stop reason: HOSPADM

## 2022-11-06 RX ORDER — DOXYCYCLINE HYCLATE 100 MG
100 TABLET ORAL 2 TIMES DAILY
Qty: 14 TABLET | Refills: 0 | Status: SHIPPED | OUTPATIENT
Start: 2022-11-06 | End: 2022-11-13

## 2022-11-06 RX ADMIN — DOXYCYCLINE HYCLATE 100 MG: 100 TABLET, COATED ORAL at 18:19

## 2022-11-06 RX ADMIN — LIDOCAINE HYDROCHLORIDE 6 ML: 10 INJECTION, SOLUTION EPIDURAL; INFILTRATION; INTRACAUDAL; PERINEURAL at 19:27

## 2022-11-06 RX ADMIN — IBUPROFEN 400 MG: 200 TABLET, FILM COATED ORAL at 18:19

## 2022-11-06 ASSESSMENT — PAIN - FUNCTIONAL ASSESSMENT: PAIN_FUNCTIONAL_ASSESSMENT: 0-10

## 2022-11-06 ASSESSMENT — ENCOUNTER SYMPTOMS
VOMITING: 0
COLOR CHANGE: 1
NAUSEA: 0
SINUS PRESSURE: 0
DIARRHEA: 0
COUGH: 0
SORE THROAT: 0
SINUS PAIN: 0
BACK PAIN: 0
ABDOMINAL PAIN: 0
SHORTNESS OF BREATH: 0
CONSTIPATION: 0

## 2022-11-06 ASSESSMENT — PAIN DESCRIPTION - PAIN TYPE: TYPE: ACUTE PAIN

## 2022-11-06 ASSESSMENT — PAIN DESCRIPTION - FREQUENCY: FREQUENCY: CONTINUOUS

## 2022-11-07 NOTE — ED PROVIDER NOTES
[place] ENCOUNTER        Pt Name: Scott Mora  MRN: [id number]  [place] 1985  Date of evaluation: 11/6/2022  Treating Resident Physician: Terri Gaston DO  Supervising Physician: Dax Ding    History obtained from chart review and the patient. CHIEF COMPLAINT       Chief Complaint   Patient presents with    Abscess     Left forearm           HISTORY OF PRESENT ILLNESS    HPI  Scott Mora is a 42 y.o. male who presents to the emergency department for evaluation of left arm swelling and abscess. Patient states he has had worsening pain, swelling to the left forearm over the last 4 to 5 days. He notes history of MRSA. Denies any systemic symptoms such as fever, chills, or systemic rash, nausea, numbness or tingling. Pertinent history of being HIV positive. Denies any changes in his antiviral medication. The patient has no other acute complaints at this time. REVIEW OF SYSTEMS   Review of Systems   Constitutional:  Negative for activity change, chills and fever. HENT:  Negative for sinus pressure, sinus pain and sore throat. Eyes:  Negative for visual disturbance. Respiratory:  Negative for cough and shortness of breath. Cardiovascular:  Negative for chest pain and palpitations. Gastrointestinal:  Negative for abdominal pain, constipation, diarrhea, nausea and vomiting. Genitourinary:  Negative for difficulty urinating and dysuria. Musculoskeletal:  Negative for arthralgias, back pain, neck pain and neck stiffness. Skin:  Positive for color change and wound. Negative for rash. Neurological:  Negative for dizziness, weakness, light-headedness, numbness and headaches. PAST MEDICAL AND SURGICAL HISTORY   No past medical history on file.   Past Surgical History:   Procedure Laterality Date    ANKLE SURGERY      HAND SURGERY Right          MEDICATIONS     Current Facility-Administered Medications: diphenhydrAMINE (BENADRYL) tablet 25 mg, 25 mg, Oral, Once, Luis Larger, DO    Current Outpatient Medications:     doxycycline hyclate (VIBRA-TABS) 100 MG tablet, Take 1 tablet by mouth 2 times daily for 7 days, Disp: 14 tablet, Rfl: 0    HYDROcodone-acetaminophen (NORCO) 5-325 MG per tablet, Take 1 tablet by mouth every 8 hours as needed for Pain for up to 2 days. Intended supply: 3 days. Take lowest dose possible to manage pain, Disp: 6 tablet, Rfl: 0    albuterol sulfate HFA (VENTOLIN HFA) 108 (90 Base) MCG/ACT inhaler, Inhale 2 puffs into the lungs 4 times daily as needed for Wheezing, Disp: 18 g, Rfl: 0    predniSONE (DELTASONE) 10 MG tablet, 2 tablets 2 times a day for 2 days then 1  Tablet 2 times a day for 2 days, then 1 tablet daily till gone, Disp: 15 tablet, Rfl: 0    Spacer/Aero-Holding Chambers ROLANDO, 1 Device by Does not apply route daily as needed (Use it with the albuterol inhaler 3-4 times daily as needed), Disp: 1 each, Rfl: 0    ibuprofen (ADVIL) 200 MG tablet, Take 3 tablets by mouth every 6 hours as needed for Pain, Disp: 120 tablet, Rfl: 3      SOCIAL HISTORY     Social History     Social History Narrative    Not on file     Social History     Tobacco Use    Smoking status: Every Day     Packs/day: 1.00     Types: Cigarettes, Cigars    Smokeless tobacco: Never   Vaping Use    Vaping Use: Never used   Substance Use Topics    Alcohol use: Yes     Alcohol/week: 1.0 standard drink     Types: 1 Shots of liquor per week     Comment: 3 x a week    Drug use: Not Currently     Types: Marijuana Yannick Calamity)         ALLERGIES     Allergies   Allergen Reactions    Dilaudid [Hydromorphone] Shortness Of Breath         FAMILY HISTORY     Family History   Problem Relation Age of Onset    High Blood Pressure Mother     High Blood Pressure Father          PREVIOUS RECORDS   Previous records reviewed:  Patient seen in November of last year for bronchitis .         PHYSICAL EXAM     ED Triage Vitals [11/06/22 1649] BP Temp Temp Source Heart Rate Resp SpO2 Height Weight   124/76 97.2 °F (36.2 °C) Oral 95 16 98 % 5' 10\" (1.778 m) 245 lb (111.1 kg)     Initial vital signs and nursing assessment reviewed and normal. Body mass index is 35.15 kg/m². Pulsoximetry is normal per my interpretation. Additional Vital Signs:  Vitals:    11/06/22 1649   BP: 124/76   Pulse: 95   Resp: 16   Temp: 97.2 °F (36.2 °C)   SpO2: 98%       Physical Exam  Constitutional:       General: He is not in acute distress. Appearance: Normal appearance. He is not ill-appearing or diaphoretic. HENT:      Head: Normocephalic and atraumatic. Mouth/Throat:      Mouth: Mucous membranes are moist.      Pharynx: Oropharynx is clear. No oropharyngeal exudate or posterior oropharyngeal erythema. Eyes:      Extraocular Movements: Extraocular movements intact. Conjunctiva/sclera: Conjunctivae normal.      Pupils: Pupils are equal, round, and reactive to light. Cardiovascular:      Rate and Rhythm: Normal rate and regular rhythm. Pulses: Normal pulses. Heart sounds: Normal heart sounds. No murmur heard. No friction rub. No gallop. Pulmonary:      Effort: Pulmonary effort is normal.      Breath sounds: Normal breath sounds. No wheezing, rhonchi or rales. Abdominal:      Palpations: Abdomen is soft. Tenderness: There is no abdominal tenderness. There is no guarding or rebound. Musculoskeletal:         General: Swelling and tenderness present. Cervical back: Normal range of motion. No rigidity. No muscular tenderness. Right lower leg: No edema. Left lower leg: No edema. Comments: 3 x 3 cm abscess to the mid volar forearm. Surrounding induration without fluctuance or purulence. Point-of-care ultrasound with cobblestoning. Small, maybe 1 cm loculated abscess visible. Plan for incision and drainage. Skin:     General: Skin is warm and dry.       Capillary Refill: Capillary refill takes less than 2 seconds. Findings: No bruising, erythema or lesion. Neurological:      General: No focal deficit present. Mental Status: He is alert and oriented to person, place, and time. Incision/Drainage    Date/Time: 11/6/2022 8:20 PM  Performed by: Lea Doty DO  Authorized by: Kristal Tineo MD     Consent:     Consent obtained:  Verbal    Consent given by:  Patient    Risks discussed:  Bleeding, incomplete drainage, pain and infection  Universal protocol:     Patient identity confirmed:  Verbally with patient  Location:     Type:  Abscess    Size:  3cm    Location:  Upper extremity    Upper extremity location:  Arm    Arm location:  L lower arm  Pre-procedure details:     Skin preparation:  Chlorhexidine  Anesthesia:     Anesthesia method:  Local infiltration    Local anesthetic:  Lidocaine 1% w/o epi  Procedure type:     Complexity:  Simple  Procedure details:     Ultrasound guidance: no      Incision types:  Single straight    Incision depth:  Subcutaneous    Wound management:  Probed and deloculated, extensive cleaning and debrided    Drainage:  Bloody    Drainage amount:  Scant    Wound treatment:  Wound left open    Packing materials:  1/4 in iodoform gauze    Amount 1/4\" iodoform:  4 cm  Post-procedure details:     Procedure completion:  Tolerated well, no immediate complications     MEDICAL DECISION MAKING   Initial Assessment:   Pleasant 66-year-old gentleman with a history of MRSA and HIV presenting with left volar forearm abscess and associated cellulitis. 3 x 3 cm region of induration without fluctuance or active purulence. Warm and erythematous. Good distal pulses. No numbness or tingling. No systemic signs of infection such as fever, chills, nausea. Do not suspect sequela of patient's HIV at this time. Do not suspect fasciitis or systemic infection such as bacteremia.   Plan:   Ultrasound  Incision and drainage  Oral antibiotics  Pain control with Motrin  Discharge with wound recheck and 5 days      ED RESULTS   Laboratory results:  Labs Reviewed - No data to display    Radiologic studies results:  No orders to display       ED Medications administered this visit:   Medications   diphenhydrAMINE (BENADRYL) tablet 25 mg (has no administration in time range)   doxycycline hyclate (VIBRA-TABS) tablet 100 mg (100 mg Oral Given 11/6/22 1819)   ibuprofen (ADVIL;MOTRIN) tablet 400 mg (400 mg Oral Given 11/6/22 1819)   lidocaine PF 1 % injection 6 mL (6 mLs IntraDERmal Given by Other 11/6/22 1927)         ED COURSE          Strict return precautions and follow up instructions were discussed with the patient prior to discharge, with which the patient agrees. MEDICATION CHANGES     Discharge Medication List as of 11/6/2022  7:12 PM        START taking these medications    Details   doxycycline hyclate (VIBRA-TABS) 100 MG tablet Take 1 tablet by mouth 2 times daily for 7 days, Disp-14 tablet, R-0Normal               FINAL DISPOSITION     Final diagnoses:   Abscess     Condition: condition: stable  Dispo: Discharge to home      This transcription was electronically signed. Parts of this transcriptions may have been dictated by use of voice recognition software and electronically transcribed, and parts may have been transcribed with the assistance of an ED scribe. The transcription may contain errors not detected in proofreading. Please refer to my supervising physician's documentation if my documentation differs.     Electronically Signed: Ayo Ovalles DO, 11/06/22, 8:22 PM        Ayo Ovalles DO  Resident  11/06/22 2022

## 2022-11-07 NOTE — ED PROVIDER NOTES
[facility] [address] [address] EMERGENCY DEPT  Emergency Department  Attending Physician Attestation       Patient was seen by  ER/IM Resident Dr. Renetta Starks and I supervised/co-managed the case    I performed a history and physical examination of the patient and discussed management with the Resident/Trainee. I reviewed the Resident's note and agree with the documented findings and plan of care. Any areas of disagreement are noted on the chart. I was personally present for the key portions of any procedures. I have documented in the chart those procedures where I was not present during the key portions. I have reviewed the emergency nurses triage note. I agree with the chief complaint, past medical history, past surgical history, allergies, medications, social and family history as documented unless otherwise noted below. For Physician Assistant/ Nurse Practitioner cases I have personally evaluated this patient and have completed at least one if not all key elements of the E/M (history, physical exam, and MDM). Additional findings are as noted. Chief Complaint      Fritz Posada is a 42 y.o. male who presented to the emergency room due to pain, redness and swelling on the left forearm for the past 5 days. Patient admits that he had history of MRSA in that area in the past.  Denies any fever, no chills, nausea weakness or numbness      System Problem List     Patient Active Problem List   Diagnosis    HIV disease ([facility] [address]    Smoking       Pertinent Physical Exam:    INITIAL VITALS: /76   Pulse 95   Temp 97.2 °F (36.2 °C) (Oral)   Resp 16   Ht 5' 10\" (1.778 m)   Wt 245 lb (111.1 kg)   SpO2 98%   BMI 35.15 kg/m²  Estimated body mass index is 35.15 kg/m² as calculated from the following:    Height as of this encounter: 5' 10\" (1.778 m). Weight as of this encounter: 245 lb (111.1 kg). Physical Exam  Constitutional:       General: He is not in acute distress. Appearance: He is well-developed.    HENT: Head: Normocephalic and atraumatic. Eyes:      Pupils: Pupils are equal, round, and reactive to light. Cardiovascular:      Rate and Rhythm: Normal rate and regular rhythm. Pulses:           Radial pulses are 2+ on the right side and 2+ on the left side. Dorsalis pedis pulses are 2+ on the right side and 2+ on the left side. Posterior tibial pulses are 2+ on the right side and 2+ on the left side. Heart sounds: Normal heart sounds. Pulmonary:      Effort: Pulmonary effort is normal.      Breath sounds: Normal breath sounds. Abdominal:      Palpations: Abdomen is soft. Tenderness: There is no abdominal tenderness. Musculoskeletal:         General: Tenderness (Left forearm with a 2 cm in diameter swelling very firm and tender with surrounding redness and tenderness. Area is warm to touch) present. Cervical back: Normal range of motion and neck supple. Skin:     Findings: No rash. Neurological:      Mental Status: He is alert. DIAGNOSTIC RESULTS     None    EMERGENCY DEPARTMENT COURSE:     Vitals:    Vitals:    11/06/22 1649   BP: 124/76   Pulse: 95   Resp: 16   Temp: 97.2 °F (36.2 °C)   TempSrc: Oral   SpO2: 98%   Weight: 245 lb (111.1 kg)   Height: 5' 10\" (1.778 m)       Medications   lidocaine PF 1 % injection 6 mL (has no administration in time range)   lidocaine PF 1 % injection (has no administration in time range)   diphenhydrAMINE (BENADRYL) tablet 25 mg (has no administration in time range)   doxycycline hyclate (VIBRA-TABS) tablet 100 mg (100 mg Oral Given 11/6/22 1819)   ibuprofen (ADVIL;MOTRIN) tablet 400 mg (400 mg Oral Given 11/6/22 1819)       The pt was seen and evaluated by me. Within the department, I observed the pt's vital signs to be within acceptable range. Within the department, the pt was treated with I & D by Dr. Shanelle Olson. I observed the pt's condition to be hemodynamically stable during the duration of their stay.  I explained my proposed course of treatment to the pt, and they were amenable to my decision. They were discharged home, and they will return to the ED if their symptoms become more severein nature, or otherwise change. Medical Decision-Making:       FINAL IMPRESSION       1. Abscess            DISPOSITION/PLAN  PATIENT REFERRED TO:  [address] [facility] [address] [facility]. [address] [place] [facility] [place] [facility]  Schedule an appointment as soon as possible for a visit in 1 week      [address] [facility] [address] [address] EMERGENCY DEPT  [address]    If symptoms worsen    DISCHARGE MEDICATIONS:  New Prescriptions    DOXYCYCLINE HYCLATE (VIBRA-TABS) 100 MG TABLET    Take 1 tablet by mouth 2 times daily for 7 days    HYDROCODONE-ACETAMINOPHEN (NORCO) 5-325 MG PER TABLET    Take 1 tablet by mouth every 8 hours as needed for Pain for up to 2 days. Intended supply: 3 days. Take lowest dose possible to manage pain         (Please note that portions of this note were completed with a voice recognition program and electronically transcribed. Efforts were [facility] edit the dictations but occasionally words are mis-transcribed . The transcription may contain errors not detected in proofreading.   This transcription was electronically signed.)        Regis Simmonds, MD  Attending Emergency Physician         Regis Simmonds, MD  11/06/22 3257

## 2022-11-07 NOTE — DISCHARGE INSTRUCTIONS
Please  and take the prescribed medication as directed. This will help fight the infection your abscess. As we discussed daily dressing changes and allow for the packing to come out on its own. You should follow-up with your primary doctor later in the week for a wound check. If you been having systemic symptoms of infection such as fever, chills, rash, or worsening wound return to the emergency department immediately for evaluation.

## 2024-02-19 ENCOUNTER — APPOINTMENT (OUTPATIENT)
Dept: CT IMAGING | Age: 39
End: 2024-02-19
Payer: COMMERCIAL

## 2024-02-19 ENCOUNTER — HOSPITAL ENCOUNTER (EMERGENCY)
Age: 39
Discharge: HOME | End: 2024-02-19
Payer: COMMERCIAL

## 2024-02-19 VITALS
BODY MASS INDEX: 33.36 KG/M2 | OXYGEN SATURATION: 96 % | TEMPERATURE: 98.5 F | HEART RATE: 89 BPM | DIASTOLIC BLOOD PRESSURE: 79 MMHG | HEIGHT: 70 IN | WEIGHT: 233 LBS | RESPIRATION RATE: 18 BRPM | SYSTOLIC BLOOD PRESSURE: 126 MMHG

## 2024-02-19 DIAGNOSIS — S39.012A STRAIN OF LUMBAR REGION, INITIAL ENCOUNTER: ICD-10-CM

## 2024-02-19 DIAGNOSIS — S16.1XXA ACUTE STRAIN OF NECK MUSCLE, INITIAL ENCOUNTER: Primary | ICD-10-CM

## 2024-02-19 PROCEDURE — 72131 CT LUMBAR SPINE W/O DYE: CPT

## 2024-02-19 PROCEDURE — 99284 EMERGENCY DEPT VISIT MOD MDM: CPT

## 2024-02-19 PROCEDURE — 72125 CT NECK SPINE W/O DYE: CPT

## 2024-02-19 RX ORDER — CYCLOBENZAPRINE HCL 10 MG
10 TABLET ORAL 3 TIMES DAILY PRN
Qty: 30 TABLET | Refills: 0 | Status: SHIPPED | OUTPATIENT
Start: 2024-02-19 | End: 2024-02-29

## 2024-02-19 ASSESSMENT — ENCOUNTER SYMPTOMS
ABDOMINAL PAIN: 0
BACK PAIN: 1
SHORTNESS OF BREATH: 0

## 2024-02-19 ASSESSMENT — PAIN SCALES - GENERAL: PAINLEVEL_OUTOF10: 8

## 2024-02-19 ASSESSMENT — PAIN - FUNCTIONAL ASSESSMENT: PAIN_FUNCTIONAL_ASSESSMENT: 0-10

## 2024-02-19 ASSESSMENT — PAIN DESCRIPTION - ORIENTATION: ORIENTATION: LEFT;LOWER

## 2024-02-19 ASSESSMENT — PAIN DESCRIPTION - LOCATION: LOCATION: BACK

## 2024-02-19 NOTE — ED PROVIDER NOTES
[facility] EMERGENCY DEPT      Pt Name: Faye Canchola  MRN: [id number]  Birthdate 1985  Date of evaluation: 2/19/2024  Provider: Zack Lewis    CHIEF COMPLAINT       Chief Complaint   Patient presents with    Fall    Back Pain       Nurses Notes reviewed and I agree except as noted in the HPI.      HISTORY OF PRESENT ILLNESS    Faye Canchola is a 38 y.o. male who presents for lower back pain.    Location/Symptom: Left lower back pain  Timing/Onset: This morning around 7am  Context/Setting: Fell at work  Quality: A stabbing pain in left lower back without radiation  Duration: Since this morning  Modifying Factors: Any movement exacerbates his pain, hasn't taken any pain medication for relief  Severity: 8/10    REVIEW OF SYSTEMS     Review of Systems   Respiratory:  Negative for shortness of breath.    Cardiovascular:  Negative for chest pain and palpitations.   Gastrointestinal:  Negative for abdominal pain.   Musculoskeletal:  Positive for back pain, gait problem, myalgias and neck pain.   Neurological:  Positive for light-headedness. Negative for dizziness, weakness and numbness.   Psychiatric/Behavioral:  Negative for confusion and self-injury.         PAST MEDICAL HISTORY    has no past medical history on file.    SURGICAL HISTORY      has a past surgical history that includes Ankle surgery and Hand surgery (Right).    CURRENT MEDICATIONS       Previous Medications    ALBUTEROL SULFATE HFA (VENTOLIN HFA) 108 (90 BASE) MCG/ACT INHALER    Inhale 2 puffs into the lungs 4 times daily as needed for Wheezing    IBUPROFEN (ADVIL) 200 MG TABLET    Take 3 tablets by mouth every 6 hours as needed for Pain    PREDNISONE (DELTASONE) 10 MG TABLET    2 tablets 2 times a day for 2 days then 1  Tablet 2 times a day for 2 days, then 1 tablet daily till gone    SPACER/AERO-HOLDING CHAMBERS ROLANDO    1 Device by Does not apply route daily as needed (Use it with the albuterol inhaler 3-4 times daily as needed)       ALLERGIES

## 2024-02-19 NOTE — ED NOTES
Pt presents to the ED after pt fell this morning getting into his car on ice. Pt states after the fall he began to feel dizzy and nausea. Pt reports L lower back pain 8/10. Pt ambulated from lobby to room #10. Pt respirations are even and unlabored.

## 2024-02-19 NOTE — ED PROVIDER NOTES
[facility] EMERGENCY DEPT      EMERGENCY MEDICINE     Pt Name: Faye Canchola  MRN: [id number]  Birthdate 1985  Date of evaluation: 2/19/2024  Provider: MOHINDER Forte    CHIEF COMPLAINT       Chief Complaint   Patient presents with    Fall    Back Pain     HISTORY OF PRESENT ILLNESS   Faye Canchola is a pleasant 38 y.o. male who presents to the emergency department from from home, by private vehicle for evaluation of low back pain.  The patient stated that he fell around 7 AM this morning at work.  Complains of pain in his lower back and his cervical spine.  He had no loss conscious did not hit his head.  He is able to ambulate.  No radiculopathy..        PASTMEDICAL HISTORY   History reviewed. No pertinent past medical history.    Patient Active Problem List   Diagnosis Code    HIV disease ([facility]) B20    Smoking F17.200     SURGICAL HISTORY       Past Surgical History:   Procedure Laterality Date    ANKLE SURGERY      HAND SURGERY Right        CURRENT MEDICATIONS       Discharge Medication List as of 2/19/2024 11:31 AM        CONTINUE these medications which have NOT CHANGED    Details   albuterol sulfate HFA (VENTOLIN HFA) 108 (90 Base) MCG/ACT inhaler Inhale 2 puffs into the lungs 4 times daily as needed for Wheezing, Disp-18 g, R-0Normal      predniSONE (DELTASONE) 10 MG tablet 2 tablets 2 times a day for 2 days then 1  Tablet 2 times a day for 2 days, then 1 tablet daily till gone, Disp-15 tablet, R-0Normal      Spacer/Aero-Holding Chambers ROLANDO DAILY PRN Starting Mon 11/8/2021, Disp-1 each, R-0, Normal      ibuprofen (ADVIL) 200 MG tablet Take 3 tablets by mouth every 6 hours as needed for Pain, Disp-120 tablet, R-3OTC             ALLERGIES     is allergic to dilaudid [hydromorphone].    FAMILY HISTORY     He indicated that his mother is alive. He indicated that his father is alive.       SOCIAL HISTORY       Social History     Tobacco Use    Smoking status: Every Day     Current packs/day: 1.00

## 2024-07-08 ENCOUNTER — APPOINTMENT (OUTPATIENT)
Dept: GENERAL RADIOLOGY | Age: 39
End: 2024-07-08
Payer: COMMERCIAL

## 2024-07-08 ENCOUNTER — HOSPITAL ENCOUNTER (EMERGENCY)
Age: 39
Discharge: HOME | End: 2024-07-08
Payer: COMMERCIAL

## 2024-07-08 VITALS
SYSTOLIC BLOOD PRESSURE: 129 MMHG | DIASTOLIC BLOOD PRESSURE: 74 MMHG | HEART RATE: 85 BPM | TEMPERATURE: 98.2 F | RESPIRATION RATE: 18 BRPM | OXYGEN SATURATION: 98 %

## 2024-07-08 DIAGNOSIS — S93.402A SPRAIN OF LEFT ANKLE, UNSPECIFIED LIGAMENT, INITIAL ENCOUNTER: Primary | ICD-10-CM
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
## 2024-07-08 DIAGNOSIS — S93.602A SPRAIN OF LEFT FOOT, INITIAL ENCOUNTER: ICD-10-CM

## 2024-07-08 PROCEDURE — 73600 X-RAY EXAM OF ANKLE: CPT

## 2024-07-08 PROCEDURE — 6370000000 HC RX 637 (ALT 250 FOR IP): Performed by: PHYSICIAN ASSISTANT

## 2024-07-08 PROCEDURE — 99283 EMERGENCY DEPT VISIT LOW MDM: CPT

## 2024-07-08 PROCEDURE — 73630 X-RAY EXAM OF FOOT: CPT

## 2024-07-08 RX ORDER — IBUPROFEN 800 MG/1
800 TABLET ORAL ONCE
Status: COMPLETED | OUTPATIENT
Start: 2024-07-08 | End: 2024-07-08

## 2024-07-08 RX ADMIN — IBUPROFEN 800 MG: 800 TABLET, FILM COATED ORAL at 16:29

## 2024-07-08 ASSESSMENT — PAIN SCALES - GENERAL: PAINLEVEL_OUTOF10: 8

## 2024-07-08 ASSESSMENT — PAIN - FUNCTIONAL ASSESSMENT: PAIN_FUNCTIONAL_ASSESSMENT: 0-10

## 2024-07-08 NOTE — ED PROVIDER NOTES
fracture or acute process.. Results were discussed with the patient and discharge plan was discussed.  Patient requested crutches which were provided.  Ace wrap and postop shoe applied additionally.  Patient was given ibuprofen 800 mg. I have given the patient strict written and verbal instructions about care at home, follow-up, and signs and symptoms of worsening of condition and they did verbalize understanding.         CRITICAL CARE:   None    CONSULTS:  None    PROCEDURES:  None    FINAL IMPRESSION      1. Sprain of left ankle, unspecified ligament, initial encounter    2. Sprain of left foot, initial encounter    3. Fall down stairs, initial encounter          DISPOSITION/PLAN     1. Sprain of left ankle, unspecified ligament, initial encounter    2. Sprain of left foot, initial encounter    3. Fall down stairs, initial encounter        PATIENT REFERRED TO:  [facility] [address] [facility] [id number] [phone]  Go in 1 week  If symptoms worsen      DISCHARGE MEDICATIONS:  Discharge Medication List as of 7/8/2024  5:09 PM          (Please note that portions of this note were completed with a voice recognition program.  Efforts were made to edit the dictations but occasionally words are mis-transcribed.)    Nona Turner PA-C 07/08/24 5:24 PM    ERIC Ulloa Jennifer, PA-C  07/08/24 [id number]